# Patient Record
Sex: MALE | Employment: UNEMPLOYED | URBAN - METROPOLITAN AREA
[De-identification: names, ages, dates, MRNs, and addresses within clinical notes are randomized per-mention and may not be internally consistent; named-entity substitution may affect disease eponyms.]

---

## 2021-11-13 ENCOUNTER — OFFICE VISIT (OUTPATIENT)
Dept: URGENT CARE | Facility: URGENT CARE | Age: 51
End: 2021-11-13

## 2021-11-13 VITALS
OXYGEN SATURATION: 99 % | DIASTOLIC BLOOD PRESSURE: 90 MMHG | SYSTOLIC BLOOD PRESSURE: 140 MMHG | TEMPERATURE: 98.5 F | HEART RATE: 85 BPM

## 2021-11-13 DIAGNOSIS — L03.90 CELLULITIS, UNSPECIFIED CELLULITIS SITE: Primary | ICD-10-CM

## 2021-11-13 DIAGNOSIS — I10 PRIMARY HYPERTENSION: ICD-10-CM

## 2021-11-13 PROCEDURE — 99204 OFFICE O/P NEW MOD 45 MIN: CPT | Performed by: FAMILY MEDICINE

## 2021-11-13 RX ORDER — DOXYCYCLINE 100 MG/1
100 CAPSULE ORAL 2 TIMES DAILY
Qty: 20 CAPSULE | Refills: 0 | Status: SHIPPED | OUTPATIENT
Start: 2021-11-13 | End: 2021-11-23

## 2021-11-13 NOTE — PROGRESS NOTES
Assessment & Plan     Cellulitis, unspecified cellulitis site  Warm compressores  - doxycycline hyclate (VIBRAMYCIN) 100 MG capsule; Take 1 capsule (100 mg) by mouth 2 times daily for 10 days    Primary hypertension  On high side,   He report takes medications for blood pressure  No chest pain, no headache.  Advised patient to continue monitoring blood pressure, follow-up with his primary care physician in the next week.    6}    Work on weight loss  Regular exercise    Return in about 1 week (around 11/20/2021) for Follow up.    Amilcar Farley MD  [facility] is a 50 year old who presents for the following health issues:  An area of redness, swelling in his right lower abdomen area for the past 10 days or so.  He is not sure if he was bit by something.  No drainage, no discharges.  Has no fever no chills.      history of hypertension, he does take medication.  Not sure what medications. Does not smoke.  Denies headache denies chest pain, no lower extremity edema.    HPI       Review of Systems   Constitutional, HEENT, cardiovascular, pulmonary, gi and gu systems are negative, except as otherwise noted.      Objective    BP (!) 140/90   Pulse 85   Temp 98.5  F (36.9  C) (Tympanic)   SpO2 99%   There is no height or weight on file to calculate BMI.  Physical Exam   GENERAL: healthy, alert and no distress  SKIN: right lower abdomin, an area of 3 cm by 5 cm of redness, warm to touch  No discharges, no indurations.  PSYCH: mentation appears normal, affect normal/bright    Amilcar Farley MD